# Patient Record
Sex: FEMALE | Race: WHITE
[De-identification: names, ages, dates, MRNs, and addresses within clinical notes are randomized per-mention and may not be internally consistent; named-entity substitution may affect disease eponyms.]

---

## 2017-12-26 NOTE — EDM.PDOC
ED HPI GENERAL MEDICAL PROBLEM





- General


Chief Complaint: General


Stated Complaint: WEAK,PAIN


Time Seen by Provider: 12/26/17 16:25


Source of Information: Reports: Patient


History Limitations: Reports: Other (limited records)





- History of Present Illness


INITIAL COMMENTS - FREE TEXT/NARRATIVE: 





86 yo female presents mainly with neck pain and stiffness. She denies a hx of 

neck arthritis or injury. Has had some chills without fever. Sx's for a couple 

days getting worse. No self tx. Some mild diffuse muscle aches as well. Went 

the clinic and was sent here for ? stroke. 


Onset: Gradual


Onset Date: 12/23/17


Duration: Day(s):, Getting Worse


Location: Reports: Neck, Other (to a lesser extent other muscles.)


Quality: Reports: Ache


Severity: Moderate


Improves with: Reports: Rest


Worsens with: Reports: Movement


Context: Reports: Other (unknown)


Associated Symptoms: Reports: Fever/Chills (no definite fever.)


Treatments PTA: Reports: Other (see below) (none)


  ** NECK


Pain Score (Numeric/FACES): 5





- Related Data


 Allergies











Allergy/AdvReac Type Severity Reaction Status Date / Time


 


Penicillins Allergy Unknown Unknown Verified 12/26/17 16:16


 


Sulfa (Sulfonamide Allergy Unknown Unknown Verified 12/26/17 16:16





Antibiotics)     











Home Meds: 


 Home Meds





Atenolol 50 mg PO BEDTIME 08/19/14 [History]


Simvastatin 10 mg PO BEDTIME 08/19/14 [History]


Metoprolol Succinate [Toprol Xl] 100 mg PO DAILY 12/26/17 [History]











Past Medical History


HEENT History: Reports: Cataract, Impaired Vision


Cardiovascular History: Reports: High Cholesterol, Hypertension


OB/GYN History: Reports: Pregnancy


Musculoskeletal History: Reports: Arthritis





- Infectious Disease History


Infectious Disease History: Reports: C-Difficile





- Past Surgical History


HEENT Surgical History: Reports: Cataract Surgery, Oral Surgery


GI Surgical History: Reports: Colonoscopy





Social & Family History





- Tobacco Use


Smoking Status *Q: Never Smoker





- Caffeine Use


Caffeine Use: Reports: Coffee, Soda





- Recreational Drug Use


Recreational Drug Use: No





ED ROS GENERAL





- Review of Systems


Review Of Systems: See Below


Constitutional: Reports: No Symptoms


HEENT: Reports: No Symptoms


Respiratory: Reports: No Symptoms


Cardiovascular: Reports: No Symptoms


Endocrine: Reports: No Symptoms


GI/Abdominal: Reports: No Symptoms


: Reports: No Symptoms


Musculoskeletal: Reports: Neck Pain, Muscle Stiffness (and soreness)


Skin: Reports: No Symptoms


Neurological: Reports: No Symptoms, Weakness (mild diffuse).  Denies: 

Difficulty Walking


Psychiatric: Reports: No Symptoms





ED EXAM, GENERAL





- Physical Exam


Exam: See Below


Exam Limited By: No Limitations


General Appearance: Alert, WD/WN, No Apparent Distress


Eye Exam: Bilateral Eye: Normal Inspection


Ears: Normal External Exam, Normal Canal, Hearing Grossly Normal, Normal TMs


Ear Exam: Bilateral Ear: Auricle Normal, Canal Normal, TM normal


Nose: Normal Inspection, Normal Mucosa, No Blood


Throat/Mouth: Normal Inspection, Normal Lips, Normal Oropharynx, Normal Voice, 

No Airway Compromise


Head: Atraumatic, Normocephalic


Neck: Limited Range of Motion, Tender Lateral, Other (Neck muscles feel tight).

  No: Lymphadenopathy (L), Thyromegaly


Respiratory/Chest: No Respiratory Distress, Lungs Clear, Normal Breath Sounds, 

No Accessory Muscle Use


Cardiovascular: Regular Rate, Rhythm, No Edema


GI/Abdominal: Normal Bowel Sounds, Soft, Non-Tender, No Distention


Back Exam: Normal Inspection.  No: CVA Tenderness (R), CVA Tenderness (L)


Extremities: Normal Inspection, Normal Range of Motion, Non-Tender, No Pedal 

Edema


Neurological: Alert, Oriented, CN II-XII Intact, Normal Cognition, No Motor/

Sensory Deficits


Psychiatric: Normal Affect, Normal Mood


Skin Exam: Warm, Dry, Intact, Normal Color, No Rash


Lymphatic: No Adenopathy





Course





- Vital Signs


Text/Narrative:: 





Some improvement with tx here in the ER.


Last Recorded V/S: 


 Last Vital Signs











Temp  36.9 C   12/26/17 16:16


 


Pulse  82   12/26/17 16:16


 


Resp  16   12/26/17 16:16


 


BP  168/82 H  12/26/17 16:16


 


Pulse Ox  97   12/26/17 16:16














- Orders/Labs/Meds


Orders: 


 Active Orders 24 hr











 Category Date Time Status


 


 Cervical Spine 2V or 3V [CR] Stat Exams  12/26/17 16:33 Taken


 


 UA W/MICROSCOPIC [URIN] Stat Lab  12/26/17 16:35 Uncollected


 


 Sodium Chloride 0.9% [Saline Flush] Med  12/26/17 16:34 Active





 10 ml FLUSH ASDIRECTED PRN   


 


 Saline Lock Insert [OM.PC] Routine Oth  12/26/17 16:34 Ordered








 Medication Orders





Sodium Chloride (Saline Flush)  10 ml FLUSH ASDIRECTED PRN


   PRN Reason: Keep Vein Open


   Last Admin: 12/26/17 16:49  Dose: 10 ml








Labs: 


 Laboratory Tests











  12/26/17 12/26/17 12/26/17 Range/Units





  16:53 16:53 16:53 


 


WBC  9.9    (4.5-11.0)  K/uL


 


RBC  3.68    (3.30-5.50)  M/uL


 


Hgb  11.5 L    (12.0-15.0)  g/dL


 


Hct  35.4 L    (36.0-48.0)  %


 


MCV  96    (80-98)  fL


 


MCH  31    (27-31)  pg


 


MCHC  33    (32-36)  %


 


Plt Count  237    (150-400)  K/uL


 


Sodium   137 L   (140-148)  mmol/L


 


Potassium   3.8   (3.6-5.2)  mmol/L


 


Chloride   100   (100-108)  mmol/L


 


Carbon Dioxide   26   (21-32)  mmol/L


 


Anion Gap   14.8 H   (5.0-14.0)  mmol/L


 


BUN   17   (7-18)  mg/dL


 


Creatinine   0.6   (0.6-1.0)  mg/dL


 


Est Cr Clr Drug Dosing   61.68   mL/min


 


Estimated GFR (MDRD)   > 60   (>60)  


 


Glucose   121 H   ()  mg/dL


 


Calcium   9.1   (8.5-10.1)  mg/dL


 


Creatine Kinase    118  ()  U/L











Meds: 


Medications











Generic Name Dose Route Start Last Admin





  Trade Name Freq  PRN Reason Stop Dose Admin


 


Sodium Chloride  10 ml  12/26/17 16:34  12/26/17 16:49





  Saline Flush  FLUSH   10 ml





  ASDIRECTED PRN   Administration





  Keep Vein Open   














Discontinued Medications














Generic Name Dose Route Start Last Admin





  Trade Name Freq  PRN Reason Stop Dose Admin


 


Cyclobenzaprine HCl  5 mg  12/26/17 16:35  12/26/17 16:49





  Flexeril  PO  12/26/17 16:36  5 mg





  ONETIME ONE   Administration


 


Ketorolac Tromethamine  15 mg  12/26/17 16:34  12/26/17 16:48





  Toradol  IVPUSH  12/26/17 16:35  15 mg





  ONETIME ONE   Administration














- Radiology Interpretation


Free Text/Narrative:: 





C spine series-degen changes C4-7, karel C6-7.





Departure





- Departure


Time of Disposition: 18:00


Disposition: Home, Self-Care 01


Condition: Fair


Clinical Impression: 


 Cervical arthritis








- Discharge Information


Referrals: 


PCP,None [Primary Care Provider] - 


Forms:  ED Department Discharge





- My Orders


Last 24 Hours: 


My Active Orders





12/26/17 16:33


Cervical Spine 2V or 3V [CR] Stat 





12/26/17 16:34


Sodium Chloride 0.9% [Saline Flush]   10 ml FLUSH ASDIRECTED PRN 


Saline Lock Insert [OM.PC] Routine 





12/26/17 16:35


UA W/MICROSCOPIC [URIN] Stat 














- Assessment/Plan


Last 24 Hours: 


My Active Orders





12/26/17 16:33


Cervical Spine 2V or 3V [CR] Stat 





12/26/17 16:34


Sodium Chloride 0.9% [Saline Flush]   10 ml FLUSH ASDIRECTED PRN 


Saline Lock Insert [OM.PC] Routine 





12/26/17 16:35


UA W/MICROSCOPIC [URIN] Stat

## 2017-12-27 NOTE — CR
Cervical Spine 2V or 3V

 

INDICATION: neck pain, decreased ROM, no injury

 

FINDINGS: Reversal of normal cervical lordosis. Degenerative disc space narrowing and endplate hypert
rophic changes most marked at the C3, C4, C5 interspaces. Slight anterolisthesis of C7 on T1. Mild di
ffuse facet arthropathy.

## 2021-02-21 ENCOUNTER — HOSPITAL ENCOUNTER (OUTPATIENT)
Dept: HOSPITAL 11 - JP.ED | Age: 86
Setting detail: OBSERVATION
LOS: 1 days | Discharge: HOME HEALTH | End: 2021-02-22
Attending: INTERNAL MEDICINE | Admitting: INTERNAL MEDICINE
Payer: MEDICARE

## 2021-02-21 DIAGNOSIS — G30.9: ICD-10-CM

## 2021-02-21 DIAGNOSIS — E86.0: ICD-10-CM

## 2021-02-21 DIAGNOSIS — E78.00: ICD-10-CM

## 2021-02-21 DIAGNOSIS — Z20.822: ICD-10-CM

## 2021-02-21 DIAGNOSIS — E87.6: ICD-10-CM

## 2021-02-21 DIAGNOSIS — Z88.2: ICD-10-CM

## 2021-02-21 DIAGNOSIS — Z88.0: ICD-10-CM

## 2021-02-21 DIAGNOSIS — M11.261: Primary | ICD-10-CM

## 2021-02-21 DIAGNOSIS — F02.80: ICD-10-CM

## 2021-02-21 DIAGNOSIS — Z79.899: ICD-10-CM

## 2021-02-21 DIAGNOSIS — I10: ICD-10-CM

## 2021-02-21 DIAGNOSIS — Z98.890: ICD-10-CM

## 2021-02-21 PROCEDURE — G0378 HOSPITAL OBSERVATION PER HR: HCPCS

## 2021-02-21 PROCEDURE — U0002 COVID-19 LAB TEST NON-CDC: HCPCS

## 2021-02-21 NOTE — EDM.PDOC
ED HPI GENERAL MEDICAL PROBLEM





- General


Chief Complaint: Lower Extremity Injury/Pain


Stated Complaint: DIFFICULTY WALKING/LEG SWOLLEN


Time Seen by Provider: 02/21/21 15:45


Source of Information: Reports: Patient, Old Records, RN


History Limitations: Reports: No Limitations





- History of Present Illness


INITIAL COMMENTS - FREE TEXT/NARRATIVE: 





87 yo female here with a swollen and painful R leg. No SOB. No pleuritic chest 

pain. No hx of injury to that leg. Denies a hx of DVT. Can't walk alone. Has not

been to her doctor. 


Onset: Gradual


Onset Date: 02/16/21


Duration: Day(s): (5 days), Getting Worse


Location: Reports: Lower Extremity, Right


Quality: Reports: Ache


Severity: Moderate


Improves with: Reports: None


Worsens with: Reports: Other (time)


Context: Reports: Other (See HPI)


Associated Symptoms: Reports: No Other Symptoms


Treatments PTA: Reports: Other (see below) (none)





- Related Data


                                    Allergies











Allergy/AdvReac Type Severity Reaction Status Date / Time


 


Penicillins Allergy Unknown Unknown Verified 02/21/21 15:13


 


Sulfa (Sulfonamide Allergy Unknown Unknown Verified 02/21/21 15:13





Antibiotics)     











Home Meds: 


                                    Home Meds





Metoprolol Succinate [Toprol Xl] 100 mg PO DAILY 12/26/17 [History]


Acetaminophen [Tylenol] 650 mg PO Q4H  tablet 10/30/18 [Rx]


Acetaminophen 2 tab PO BID 02/21/21 [History]


Alendronate Sodium 70 mg PO WEEKLY 02/21/21 [History]


Cholestyramine/Aspartame [Cholestyramine Light Powder] 150 gm PO ASDIRECTED 

02/21/21 [History]


Donepezil HCl [Aricept] 10 mg PO BEDTIME 02/21/21 [History]











Past Medical History


HEENT History: Reports: Cataract, Impaired Vision


Cardiovascular History: Reports: High Cholesterol, Hypertension


Gastrointestinal History: Reports: Other (See Below)


Other Gastrointestinal History: mass in the colon will be having surgery 

10/24/2018


Genitourinary History: Reports: None


OB/GYN History: Reports: Pregnancy


Musculoskeletal History: Reports: Arthritis


Psychiatric History: Reports: Other (See Below)


Other Psychiatric History: memory loss


Hematologic History: Reports: Anemia, Iron Deficiency


Oncologic (Cancer) History: Reports: Other (See Below)


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





- Infectious Disease History


Infectious Disease History: Reports: Chicken Pox





- Past Surgical History


Head Surgeries/Procedures: Reports: None


HEENT Surgical History: Reports: Cataract Surgery, Oral Surgery


Cardiovascular Surgical History: Reports: None


GI Surgical History: Reports: Colonoscopy, EGD


Female  Surgical History: Reports: Tubal Ligation


Musculoskeletal Surgical History: Reports: None


Oncologic Surgical History: Reports: None


Dermatological Surgical History: Reports: None





Social & Family History





- Family History


Family Medical History: No Pertinent Family History





- Tobacco Use


Tobacco Use Status *Q: Never Tobacco User


Second Hand Smoke Exposure: No





- Caffeine Use


Caffeine Use: Reports: Coffee, Soda





- Recreational Drug Use


Recreational Drug Use: No





Review of Systems





- Review of Systems


Review Of Systems: See Below


Constitutional: Reports: No Symptoms


Respiratory: Denies: Shortness of Breath, Pleuritic Chest Pain, Hemoptysis


Cardiovascular: Reports: No Symptoms


Musculoskeletal: Reports: Other (entire R leg swollen.)


Skin: Reports: No Symptoms


Neurological: Reports: No Symptoms





ED EXAM, GENERAL





- Physical Exam


Exam: See Below


Exam Limited By: No Limitations


General Appearance: Alert, WD/WN, No Apparent Distress


Ears: Normal External Exam, Hearing Grossly Normal


Ear Exam: Bilateral Ear: Auricle Normal, Canal Normal


Nose: Normal Inspection, No Blood


Throat/Mouth: Normal Inspection, Normal Lips, Normal Oropharynx, Normal Voice, 

No Airway Compromise


Head: Atraumatic, Normocephalic


Neck: Normal Inspection


Respiratory/Chest: No Respiratory Distress, Lungs Clear, Normal Breath Sounds, 

No Accessory Muscle Use


Cardiovascular: Regular Rate, Rhythm, No Edema


Back Exam: Normal Inspection.  No: CVA Tenderness (R), CVA Tenderness (L)


Extremities: Normal Inspection, Normal Range of Motion, Non-Tender, Pedal Edema 

(R leg slightly swollen and warmer than the left leg. Vanda's positive on R. )


Neurological: Alert, Oriented, CN II-XII Intact, Normal Cognition, No 

Motor/Sensory Deficits


Psychiatric: Normal Affect, Normal Mood


Skin Exam: Warm, Dry, Intact, Normal Color, No Rash





Course





- Vital Signs


Text/Narrative:: 





Dr. Lageson called @ 1810h


Last Recorded V/S: 


                                Last Vital Signs











Temp  37.4 C   02/21/21 15:18


 


Pulse  96   02/21/21 17:48


 


Resp  16   02/21/21 15:18


 


BP  157/88 H  02/21/21 17:48


 


Pulse Ox  98   02/21/21 15:52














- Orders/Labs/Meds


Orders: 


                               Active Orders 24 hr











 Category Date Time Status


 


 VL Duplex Lwr Ext Veins Ltd Rt [US] Stat Exams  02/21/21 16:00 Taken


 


 NS + KCl 20mEq/L [Normal Saline with 20 mEq KCl] 1,000 Med  02/21/21 17:30 

Active





 ml   





 IV ASDIRECTED   








                                Medication Orders





Potassium Chloride/Sodium Chloride (Normal Saline With 20 Meq Kcl)  1,000 mls @ 

500 mls/hr IV ASDIRECTED RUTH ANN


   Last Admin: 02/21/21 17:39  Dose: 500 mls/hr


   Documented by: UJAN








Labs: 


                                Laboratory Tests











  02/21/21 02/21/21 02/21/21 Range/Units





  16:28 16:28 16:28 


 


WBC   8.1   (4.5-11.0)  K/uL


 


RBC   3.75   (3.30-5.50)  M/uL


 


Hgb   11.3 L   (12.0-15.0)  g/dL


 


Hct   36.1   (36.0-48.0)  %


 


MCV   96   (80-98)  fL


 


MCH   30   (27-31)  pg


 


MCHC   31 L   (32-36)  %


 


Plt Count   230   (150-400)  K/uL


 


D-Dimer, Quantitative  > 98332.00 H    (0.0-500.0)  ng/mL


 


Sodium    138 L  (140-148)  mmol/L


 


Potassium    3.3 L  (3.6-5.2)  mmol/L


 


Chloride    98 L  (100-108)  mmol/L


 


Carbon Dioxide    26  (21-32)  mmol/L


 


Anion Gap    17.3 H  (5.0-14.0)  mmol/L


 


BUN    22 H D  (7-18)  mg/dL


 


Creatinine    0.8  (0.6-1.0)  mg/dL


 


Est Cr Clr Drug Dosing    45.50  mL/min


 


Estimated GFR (MDRD)    > 60  (>60)  


 


Glucose    118 H  ()  mg/dL


 


Calcium    9.2  (8.5-10.1)  mg/dL


 


Magnesium     (1.8-2.4)  mg/dL


 


Troponin I    < 0.017  (0.000-0.056)  ng/mL


 


C-Reactive Protein     (0.0-0.3)  mg/dL


 


Procalcitonin     ng/mL


 


Urine Color     (YELLOW)  


 


Urine Appearance     (CLEAR)  


 


Urine pH     (5.0-8.0)  


 


Ur Specific Gravity     (1.008-1.030)  


 


Urine Protein     (NEGATIVE)  mg/dL


 


Urine Glucose (UA)     (NEGATIVE)  mg/dL


 


Urine Ketones     (NEGATIVE)  mg/dL


 


Urine Occult Blood     (NEGATIVE)  


 


Urine Nitrite     (NEGATIVE)  


 


Urine Bilirubin     (NEGATIVE)  


 


Urine Urobilinogen     (0.2-1.0)  EU/dL


 


Ur Leukocyte Esterase     (NEGATIVE)  


 


Urine RBC     (0-5)  


 


Urine WBC     (0-5)  


 


Ur Epithelial Cells     


 


Amorphous Sediment     


 


Urine Bacteria     


 


Urine Mucus     


 


SARS CoV-2 RNA Rapid AMAURY     














  02/21/21 02/21/21 02/21/21 Range/Units





  17:28 17:30 17:32 


 


WBC     (4.5-11.0)  K/uL


 


RBC     (3.30-5.50)  M/uL


 


Hgb     (12.0-15.0)  g/dL


 


Hct     (36.0-48.0)  %


 


MCV     (80-98)  fL


 


MCH     (27-31)  pg


 


MCHC     (32-36)  %


 


Plt Count     (150-400)  K/uL


 


D-Dimer, Quantitative     (0.0-500.0)  ng/mL


 


Sodium     (140-148)  mmol/L


 


Potassium     (3.6-5.2)  mmol/L


 


Chloride     (100-108)  mmol/L


 


Carbon Dioxide     (21-32)  mmol/L


 


Anion Gap     (5.0-14.0)  mmol/L


 


BUN     (7-18)  mg/dL


 


Creatinine     (0.6-1.0)  mg/dL


 


Est Cr Clr Drug Dosing     mL/min


 


Estimated GFR (MDRD)     (>60)  


 


Glucose     ()  mg/dL


 


Calcium     (8.5-10.1)  mg/dL


 


Magnesium    2.2  (1.8-2.4)  mg/dL


 


Troponin I     (0.000-0.056)  ng/mL


 


C-Reactive Protein    17.24 H  (0.0-0.3)  mg/dL


 


Procalcitonin     ng/mL


 


Urine Color  Yellow    (YELLOW)  


 


Urine Appearance  Clear    (CLEAR)  


 


Urine pH  5.5    (5.0-8.0)  


 


Ur Specific Gravity  1.025    (1.008-1.030)  


 


Urine Protein  100 H    (NEGATIVE)  mg/dL


 


Urine Glucose (UA)  Negative    (NEGATIVE)  mg/dL


 


Urine Ketones  40 H    (NEGATIVE)  mg/dL


 


Urine Occult Blood  Moderate H    (NEGATIVE)  


 


Urine Nitrite  Negative    (NEGATIVE)  


 


Urine Bilirubin  Small H    (NEGATIVE)  


 


Urine Urobilinogen  1.0    (0.2-1.0)  EU/dL


 


Ur Leukocyte Esterase  Negative    (NEGATIVE)  


 


Urine RBC  0-5    (0-5)  


 


Urine WBC  0-5    (0-5)  


 


Ur Epithelial Cells  Occasional    


 


Amorphous Sediment  Occasional    


 


Urine Bacteria  Occasional    


 


Urine Mucus  Occasional    


 


SARS CoV-2 RNA Rapid AMAURY   Negative   














  02/21/21 Range/Units





  17:32 


 


WBC   (4.5-11.0)  K/uL


 


RBC   (3.30-5.50)  M/uL


 


Hgb   (12.0-15.0)  g/dL


 


Hct   (36.0-48.0)  %


 


MCV   (80-98)  fL


 


MCH   (27-31)  pg


 


MCHC   (32-36)  %


 


Plt Count   (150-400)  K/uL


 


D-Dimer, Quantitative   (0.0-500.0)  ng/mL


 


Sodium   (140-148)  mmol/L


 


Potassium   (3.6-5.2)  mmol/L


 


Chloride   (100-108)  mmol/L


 


Carbon Dioxide   (21-32)  mmol/L


 


Anion Gap   (5.0-14.0)  mmol/L


 


BUN   (7-18)  mg/dL


 


Creatinine   (0.6-1.0)  mg/dL


 


Est Cr Clr Drug Dosing   mL/min


 


Estimated GFR (MDRD)   (>60)  


 


Glucose   ()  mg/dL


 


Calcium   (8.5-10.1)  mg/dL


 


Magnesium   (1.8-2.4)  mg/dL


 


Troponin I   (0.000-0.056)  ng/mL


 


C-Reactive Protein   (0.0-0.3)  mg/dL


 


Procalcitonin  0.23  ng/mL


 


Urine Color   (YELLOW)  


 


Urine Appearance   (CLEAR)  


 


Urine pH   (5.0-8.0)  


 


Ur Specific Gravity   (1.008-1.030)  


 


Urine Protein   (NEGATIVE)  mg/dL


 


Urine Glucose (UA)   (NEGATIVE)  mg/dL


 


Urine Ketones   (NEGATIVE)  mg/dL


 


Urine Occult Blood   (NEGATIVE)  


 


Urine Nitrite   (NEGATIVE)  


 


Urine Bilirubin   (NEGATIVE)  


 


Urine Urobilinogen   (0.2-1.0)  EU/dL


 


Ur Leukocyte Esterase   (NEGATIVE)  


 


Urine RBC   (0-5)  


 


Urine WBC   (0-5)  


 


Ur Epithelial Cells   


 


Amorphous Sediment   


 


Urine Bacteria   


 


Urine Mucus   


 


SARS CoV-2 RNA Rapid AMAURY   











Meds: 


Medications











Generic Name Dose Route Start Last Admin





  Trade Name Freq  PRN Reason Stop Dose Admin


 


Potassium Chloride/Sodium Chloride  1,000 mls @ 500 mls/hr  02/21/21 17:30  

02/21/21 17:39





  Normal Saline With 20 Meq Kcl  IV   500 mls/hr





  ASDIRECTED RUTH ANN   Administration














Discontinued Medications














Generic Name Dose Route Start Last Admin





  Trade Name Freq  PRN Reason Stop Dose Admin


 


Potassium Chloride  20 meq  02/21/21 17:31  02/21/21 17:43





  Klor-Con M20  PO  02/21/21 17:32  20 meq





  ONETIME ONE   Administration














- Re-Assessments/Exams


Free Text/Narrative Re-Assessment/Exam: 





02/21/21 17:02


Venous doppler neg for DVT, now tells me she is weak and not eating due to no 

appetite. 





Departure





- Departure


Time of Disposition: 18:15


Disposition: Refer to Observation


Condition: Fair


Clinical Impression: 


 Mild dehydration, Hypokalemia, Elevated d-dimer, Elevated C-reactive protein 

(CRP), Left leg pain, Inability to walk, Anorexia








- Discharge Information


*PRESCRIPTION DRUG MONITORING PROGRAM REVIEWED*: Not Applicable


*COPY OF PRESCRIPTION DRUG MONITORING REPORT IN PATIENT JUDY: Not Applicable


Referrals: 


Parul Meraz PA-C [Primary Care Provider] - 


Forms:  ED Department Discharge





Sepsis Event Note (ED)





- Evaluation


Sepsis Screening Result: No Definite Risk





- Focused Exam


Vital Signs: 


                                   Vital Signs











  Temp Pulse Resp BP Pulse Ox


 


 02/21/21 17:48   96   157/88 H 


 


 02/21/21 17:18   91   141/71 H 


 


 02/21/21 17:04   96   139/73 


 


 02/21/21 15:52   97   137/72  98


 


 02/21/21 15:18  37.4 C  95  16  140/74  96


 


 02/21/21 15:12  37.4 C  95  16  140/74  96














- My Orders


Last 24 Hours: 


My Active Orders





02/21/21 16:00


VL Duplex Lwr Ext Veins Ltd Rt [US] Stat 





02/21/21 17:30


NS + KCl 20mEq/L [Normal Saline with 20 mEq KCl] 1,000 ml IV ASDIRECTED 














- Assessment/Plan


Last 24 Hours: 


My Active Orders





02/21/21 16:00


VL Duplex Lwr Ext Veins Ltd Rt [US] Stat 





02/21/21 17:30


NS + KCl 20mEq/L [Normal Saline with 20 mEq KCl] 1,000 ml IV ASDIRECTED

## 2021-02-21 NOTE — PCM.HP.2
H&P History of Present Illness





- General


Date of Service: 02/21/21


Admit Problem/Dx: 


                           Admission Diagnosis/Problem





Admission Diagnosis/Problem      Pain








Source of Information: Patient, Family, Provider, RN Notes Reviewed


History Limitations: Reports: Altered Mental Status (Dementia)





- History of Present Illness


Initial Comments - Free Text/Narative: 





Ms. Drummond is an 88-year-old woman who was admitted to observation status 

through the emergency department with right foot and knee pain secondary to 

gout.  She does experience some difficulty with history because of underlying 

dementia.  She thinks that she has had significant pain in her right leg over 

the past 4 days.  Pain progressed to the point where she is unable to bear 

weight or ambulate.  During this period of time she has also had very poor oral 

intake and experienced an episode of nausea vomiting related to use of Tylenol. 

She presented to the emergency department today because of swelling and pain.  

D-dimer was obtained and found to be markedly elevated greater than 10,000.  

Venous Doppler study of the right lower extremity was negative for deep vein 

thrombosis.  CRP was found to be significantly elevated, pro calcitonin 

borderline at 0.23.  White blood cell count is within normal range and she has 

been afebrile while in the emergency department.





- Related Data


Allergies/Adverse Reactions: 


                                    Allergies











Allergy/AdvReac Type Severity Reaction Status Date / Time


 


Penicillins Allergy Unknown Unknown Verified 02/21/21 15:13


 


Sulfa (Sulfonamide Allergy Unknown Unknown Verified 02/21/21 15:13





Antibiotics)     











Home Medications: 


                                    Home Meds





Metoprolol Succinate [Toprol Xl] 100 mg PO DAILY 12/26/17 [History]


Acetaminophen [Tylenol] 650 mg PO Q4H  tablet 10/30/18 [Rx]


Acetaminophen 2 tab PO BID 02/21/21 [History]


Alendronate Sodium 70 mg PO WEEKLY 02/21/21 [History]


Cholestyramine/Aspartame [Cholestyramine Light Powder] 150 gm PO ASDIRECTED 

02/21/21 [History]


Donepezil HCl [Aricept] 10 mg PO BEDTIME 02/21/21 [History]











Past Medical History


HEENT History: Reports: Cataract, Impaired Vision


Cardiovascular History: Reports: High Cholesterol, Hypertension


Gastrointestinal History: Reports: Other (See Below)


Other Gastrointestinal History: mass in the colon will be having surgery 

10/24/2018


Genitourinary History: Reports: None


OB/GYN History: Reports: Pregnancy


Musculoskeletal History: Reports: Arthritis


Psychiatric History: Reports: Other (See Below)


Other Psychiatric History: memory loss


Hematologic History: Reports: Anemia, Iron Deficiency


Oncologic (Cancer) History: Reports: Other (See Below)


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





- Infectious Disease History


Infectious Disease History: Reports: Chicken Pox





- Past Surgical History


Head Surgeries/Procedures: Reports: None


HEENT Surgical History: Reports: Cataract Surgery, Oral Surgery


Cardiovascular Surgical History: Reports: None


GI Surgical History: Reports: Colonoscopy, EGD


Female  Surgical History: Reports: Tubal Ligation


Musculoskeletal Surgical History: Reports: None


Oncologic Surgical History: Reports: None


Dermatological Surgical History: Reports: None





Social & Family History





- Family History


Family Medical History: No Pertinent Family History





- Tobacco Use


Tobacco Use Status *Q: Never Tobacco User


Second Hand Smoke Exposure: No





- Caffeine Use


Caffeine Use: Reports: Coffee, Soda





- Recreational Drug Use


Recreational Drug Use: No





H&P Review of Systems





- Review of Systems:


Review Of Systems: See Below


General: Reports: Malaise, Weakness.  Denies: Fever, Chills


HEENT: Reports: No Symptoms


Pulmonary: Reports: No Symptoms


Cardiovascular: Reports: No Symptoms


Gastrointestinal: Reports: No Symptoms


Genitourinary: Reports: No Symptoms


Musculoskeletal: Reports: Other (Right knee and first MTP joint pain)


Skin: Reports: No Symptoms


Psychiatric: Reports: No Symptoms


Neurological: Reports: No Symptoms


Hematologic/Lymphatic: Reports: No Symptoms


Immunologic: Reports: No Symptoms





Exam





- Exam


Exam: See Below





- Vital Signs


Vital Signs: 


                                Last Vital Signs











Temp  99.4 F   02/21/21 15:18


 


Pulse  96   02/21/21 17:48


 


Resp  16   02/21/21 15:18


 


BP  157/88 H  02/21/21 17:48


 


Pulse Ox  98   02/21/21 15:52











Weight: 133 lb





- Exam


Quality Assessment: DVT Prophylaxis


General: Alert, Cooperative, Mild Distress.  No: Oriented


HEENT: Conjunctiva Clear, Hearing Intact, Normal Nasal Septum, Posterior Pharynx

 Clear, Pupils Equal.  No: Mucosa Moist & Pink


Neck: Supple, Trachea Midline, +2 Carotid Pulse wo Bruit


Lungs: Clear to Auscultation, Normal Respiratory Effort


Cardiovascular: Regular Rate, Regular Rhythm, Normal S1, Normal S2.  No: 

Systolic Murmur, Diastolic Murmur


GI/Abdominal Exam: Soft, Non-Tender, No Organomegaly, No Distention


Back Exam: Normal Inspection, Full Range of Motion


Extremities: Other (Swelling and pain with movement of the right knee, swelling 

and pain to palpation and with movement of the right first MTP joint)


Skin: Warm, Dry, Intact


Neurological: Cranial Nerves Intact, Strength Equal Bilateral, Normal Speech, 

Normal Tone, Sensation Intact.  No: Focal Deficit


Neuro Extensive - Mental Status: Alert, Normal Mood/Affect, Memory Loss-Remote 

Events, Memory Loss-Recent Events.  No: Oriented x3, Normal Cognition, Memory 

Intact





- Patient Data


Lab Results Last 24 hrs: 


                         Laboratory Results - last 24 hr











  02/21/21 02/21/21 02/21/21 Range/Units





  16:28 16:28 16:28 


 


WBC   8.1   (4.5-11.0)  K/uL


 


RBC   3.75   (3.30-5.50)  M/uL


 


Hgb   11.3 L   (12.0-15.0)  g/dL


 


Hct   36.1   (36.0-48.0)  %


 


MCV   96   (80-98)  fL


 


MCH   30   (27-31)  pg


 


MCHC   31 L   (32-36)  %


 


Plt Count   230   (150-400)  K/uL


 


D-Dimer, Quantitative  > 42238.00 H    (0.0-500.0)  ng/mL


 


Sodium    138 L  (140-148)  mmol/L


 


Potassium    3.3 L  (3.6-5.2)  mmol/L


 


Chloride    98 L  (100-108)  mmol/L


 


Carbon Dioxide    26  (21-32)  mmol/L


 


Anion Gap    17.3 H  (5.0-14.0)  mmol/L


 


BUN    22 H D  (7-18)  mg/dL


 


Creatinine    0.8  (0.6-1.0)  mg/dL


 


Est Cr Clr Drug Dosing    45.50  mL/min


 


Estimated GFR (MDRD)    > 60  (>60)  


 


Glucose    118 H  ()  mg/dL


 


Calcium    9.2  (8.5-10.1)  mg/dL


 


Magnesium     (1.8-2.4)  mg/dL


 


Troponin I    < 0.017  (0.000-0.056)  ng/mL


 


C-Reactive Protein     (0.0-0.3)  mg/dL


 


Procalcitonin     ng/mL


 


Urine Color     (YELLOW)  


 


Urine Appearance     (CLEAR)  


 


Urine pH     (5.0-8.0)  


 


Ur Specific Gravity     (1.008-1.030)  


 


Urine Protein     (NEGATIVE)  mg/dL


 


Urine Glucose (UA)     (NEGATIVE)  mg/dL


 


Urine Ketones     (NEGATIVE)  mg/dL


 


Urine Occult Blood     (NEGATIVE)  


 


Urine Nitrite     (NEGATIVE)  


 


Urine Bilirubin     (NEGATIVE)  


 


Urine Urobilinogen     (0.2-1.0)  EU/dL


 


Ur Leukocyte Esterase     (NEGATIVE)  


 


Urine RBC     (0-5)  


 


Urine WBC     (0-5)  


 


Ur Epithelial Cells     


 


Amorphous Sediment     


 


Urine Bacteria     


 


Urine Mucus     


 


SARS CoV-2 RNA Rapid AMAURY     














  02/21/21 02/21/21 02/21/21 Range/Units





  17:28 17:30 17:32 


 


WBC     (4.5-11.0)  K/uL


 


RBC     (3.30-5.50)  M/uL


 


Hgb     (12.0-15.0)  g/dL


 


Hct     (36.0-48.0)  %


 


MCV     (80-98)  fL


 


MCH     (27-31)  pg


 


MCHC     (32-36)  %


 


Plt Count     (150-400)  K/uL


 


D-Dimer, Quantitative     (0.0-500.0)  ng/mL


 


Sodium     (140-148)  mmol/L


 


Potassium     (3.6-5.2)  mmol/L


 


Chloride     (100-108)  mmol/L


 


Carbon Dioxide     (21-32)  mmol/L


 


Anion Gap     (5.0-14.0)  mmol/L


 


BUN     (7-18)  mg/dL


 


Creatinine     (0.6-1.0)  mg/dL


 


Est Cr Clr Drug Dosing     mL/min


 


Estimated GFR (MDRD)     (>60)  


 


Glucose     ()  mg/dL


 


Calcium     (8.5-10.1)  mg/dL


 


Magnesium    2.2  (1.8-2.4)  mg/dL


 


Troponin I     (0.000-0.056)  ng/mL


 


C-Reactive Protein    17.24 H  (0.0-0.3)  mg/dL


 


Procalcitonin     ng/mL


 


Urine Color  Yellow    (YELLOW)  


 


Urine Appearance  Clear    (CLEAR)  


 


Urine pH  5.5    (5.0-8.0)  


 


Ur Specific Gravity  1.025    (1.008-1.030)  


 


Urine Protein  100 H    (NEGATIVE)  mg/dL


 


Urine Glucose (UA)  Negative    (NEGATIVE)  mg/dL


 


Urine Ketones  40 H    (NEGATIVE)  mg/dL


 


Urine Occult Blood  Moderate H    (NEGATIVE)  


 


Urine Nitrite  Negative    (NEGATIVE)  


 


Urine Bilirubin  Small H    (NEGATIVE)  


 


Urine Urobilinogen  1.0    (0.2-1.0)  EU/dL


 


Ur Leukocyte Esterase  Negative    (NEGATIVE)  


 


Urine RBC  0-5    (0-5)  


 


Urine WBC  0-5    (0-5)  


 


Ur Epithelial Cells  Occasional    


 


Amorphous Sediment  Occasional    


 


Urine Bacteria  Occasional    


 


Urine Mucus  Occasional    


 


SARS CoV-2 RNA Rapid AMAURY   Negative   














  02/21/21 Range/Units





  17:32 


 


WBC   (4.5-11.0)  K/uL


 


RBC   (3.30-5.50)  M/uL


 


Hgb   (12.0-15.0)  g/dL


 


Hct   (36.0-48.0)  %


 


MCV   (80-98)  fL


 


MCH   (27-31)  pg


 


MCHC   (32-36)  %


 


Plt Count   (150-400)  K/uL


 


D-Dimer, Quantitative   (0.0-500.0)  ng/mL


 


Sodium   (140-148)  mmol/L


 


Potassium   (3.6-5.2)  mmol/L


 


Chloride   (100-108)  mmol/L


 


Carbon Dioxide   (21-32)  mmol/L


 


Anion Gap   (5.0-14.0)  mmol/L


 


BUN   (7-18)  mg/dL


 


Creatinine   (0.6-1.0)  mg/dL


 


Est Cr Clr Drug Dosing   mL/min


 


Estimated GFR (MDRD)   (>60)  


 


Glucose   ()  mg/dL


 


Calcium   (8.5-10.1)  mg/dL


 


Magnesium   (1.8-2.4)  mg/dL


 


Troponin I   (0.000-0.056)  ng/mL


 


C-Reactive Protein   (0.0-0.3)  mg/dL


 


Procalcitonin  0.23  ng/mL


 


Urine Color   (YELLOW)  


 


Urine Appearance   (CLEAR)  


 


Urine pH   (5.0-8.0)  


 


Ur Specific Gravity   (1.008-1.030)  


 


Urine Protein   (NEGATIVE)  mg/dL


 


Urine Glucose (UA)   (NEGATIVE)  mg/dL


 


Urine Ketones   (NEGATIVE)  mg/dL


 


Urine Occult Blood   (NEGATIVE)  


 


Urine Nitrite   (NEGATIVE)  


 


Urine Bilirubin   (NEGATIVE)  


 


Urine Urobilinogen   (0.2-1.0)  EU/dL


 


Ur Leukocyte Esterase   (NEGATIVE)  


 


Urine RBC   (0-5)  


 


Urine WBC   (0-5)  


 


Ur Epithelial Cells   


 


Amorphous Sediment   


 


Urine Bacteria   


 


Urine Mucus   


 


SARS CoV-2 RNA Rapid AMAURY   











Result Diagrams: 


                                 02/21/21 16:28





                                 02/21/21 16:28





Sepsis Event Note





- Evaluation


Sepsis Screening Result: No Definite Risk





- Focused Exam


Vital Signs: 


                                   Vital Signs











  Temp Pulse Resp BP Pulse Ox


 


 02/21/21 17:48   96   157/88 H 


 


 02/21/21 17:18   91   141/71 H 


 


 02/21/21 17:04   96   139/73 


 


 02/21/21 15:52   97   137/72  98


 


 02/21/21 15:18  99.4 F  95  16  140/74  96


 


 02/21/21 15:12  99.4 F  95  16  140/74  96














*Q Meaningful Use (ADM)





- VTE Risk Assess *Q


Each Risk Factor Represents 1 Point: None


Total Score 1 Point Risk Factors: 0


Each Risk Factor Represents 2 Points: None


Total Score 2 Point Risk Factors: 0


Each Risk Factor Represents 3 Points: Age 75 Years or Greater


Total Score 3 Point Risk Factors: 3


Each Risk Factor Represents 5 Points: None


Total Score 5 Point Risk Factors: 0


Venous Thromboembolism Risk Factor Score *Q: 3


Problem List Initiated/Reviewed/Updated: Yes


Orders Last 24hrs: 


                               Active Orders 24 hr











 Category Date Time Status


 


 Patient Status Manage Transfer [TRANSFER] Routine ADT  02/21/21 18:26 Ordered


 


 VL Duplex Lwr Ext Veins Ltd Rt [US] Stat Exams  02/21/21 16:00 Taken


 


 NS + KCl 20mEq/L [Normal Saline with 20 mEq KCl] 1,000 Med  02/21/21 17:30 

Active





 ml   





 IV ASDIRECTED   


 


 Resuscitation Status Routine Resus Stat  02/21/21 18:27 Ordered








                                Medication Orders





Potassium Chloride/Sodium Chloride (Normal Saline With 20 Meq Kcl)  1,000 mls @ 

500 mls/hr IV ASDIRECTED RUTH ANN


   Last Admin: 02/21/21 17:39  Dose: 500 mls/hr


   Documented by: JUAN








Assessment/Plan Comment:: 





ASSESSMENT AND PLAN





GOUT-involving the right knee and right first MTP joint.  Abrupt onset of 

symptoms approximately 4 days ago.  Because of pain she is unable to ambulate or

bear weight.  Swelling noted about the right knee and pain with movement.  

Swelling and tenderness to palpation right first MTP joint.


-Uric acid level pending


-Solu-Medrol 60 mg IV now


-Prednisone 30 mg p.o. daily, continue until symptoms have resolved for 3 days





DEHYDRATION-secondary to poor oral intake over the past few days


-IV fluids for hydration





MILD HYPOKALEMIA


-Oral potassium replacement


-Reassess potassium level in a.m.





NAUSEA AND VOMITING-isolated episode after she took Tylenol.  No symptoms 

present today.


-Monitor for recurrence during hospitalization





MAINTENANCE ISSUES


-DVT prophylaxis; Lovenox 40 mg subcu daily


-GI prophylaxis; not indicated


-Viera catheter; not indicated


-Nutrition; 2 g sodium diet


-Nicotine dependence; not required





CODE STATUS-FULL CODE





ADMISSION STATUS-this patient will be admitted to observation status, expect no 

more than a one night hospital stay for evaluation and management of problems as

outlined above.





DISPOSITION-anticipate discharge to home after the hospital stay.





PRIMARY CARE PROVIDER-Parul Meraz





- Mortality Measure


Prognosis:: Good

## 2021-02-22 NOTE — US
VL Duplex Lwr Ext Veins Ltd Rt

 

INDICATION: RT LEG PAIN AND SWELLING

 

FINDINGS: Ultrasound examination of the lower extremity using Doppler and

compressive technique demonstrates that the common femoral, femoral, and

popliteal veins are patent, and negative for thrombus. The calf veins were

segmentally visualized and are negative where seen. 

 

 

IMPRESSION: Negative for deep venous thrombosis.

## 2021-02-22 NOTE — [id number]
**Discharge Summary





- Hospital Course


Brief History: 88-year-old female with history of essential hypertension and 

mild Alzheimer's dementia who presented with right knee and right foot pain and 

swelling.  She was admitted for management of presumed pseudogout with inability

to bear any weight.


Diagnosis: Stroke: No





- Discharge Data


Discharge Date: 02/22/21


Discharge Disposition: Home, W [facility] Health Agency [facility]


Condition: Good





- Referral to Home Health


Date of Face to Face Encounter: 02/22/21


Reason for Homebound Status: Acute knee pain complicating chronic weakness


Primary Care Physician: 


Parul Meraz PA-C





Skilled Need: Nursing, physical therapy and home health aide





- Discharge Diagnosis/Problem(s)


(1) Pseudogout of right knee


SNOMED Code(s): 703299223, 885982226


   ICD Code: M11.261 - OTHER CHONDROCALCINOSIS, RIGHT KNEE   Status: Acute   

Current Visit: Yes   





(2) Mild dehydration


SNOMED Code(s): 4129849400914


   ICD Code: E86.0 - DEHYDRATION   Status: Acute   Current Visit: Yes   





(3) Hypokalemia


SNOMED Code(s): 55176536


   ICD Code: E87.6 - HYPOKALEMIA   Status: Acute   Current Visit: Yes   





- Patient Summary/Data


Consults: 


                                  Consultations





02/21/21 19:10


PT Evaluation and Treatment [CONS] Routine 


   Please Evaluate and Treat.


   PT Reason for Consult: Right leg pain


   This query below is only for informational purposes and is not editable.











Hospital Course: 





Toshia presented to the emergency room with acute onset of right knee and right 

foot pain.  Work-up in the emergency room raised concern for pseudogout.  The 

patient was not able to bear any weight and was admitted for additional 

management and pain control.  Her uric acid level was normal so pseudogout was 

suspected with the acute onset and recent dehydration.  She received some IV 

fluids as well as IV Solu-Medrol at the time of admission and was started on 

prednisone in the morning after admission.  By the day after admission she was 

feeling a fair amount better.  She was able to bear weight and was able to get 

to the bathroom and back with her walker.  She did work with physical therapy.  

There is minimal swelling but no significant warmth or redness remaining.  She 

feels well enough to go home at this time.  I believe she is safe for outpatient

 management.  She was interested in home health care services.  She will get 

prednisone daily for 3 more days.  She should follow-up in approximately 1 week.





- Patient Instructions


Diet: Regular Diet as Tolerated


Activity: As Tolerated


Showering/Bathing: May Shower


Notify Provider of: Fever, Increased Pain


Other/Special Instructions: 1. You were in the hospital for management of pain 

and swelling involving the right knee and right foot.  I suspect that the pain 

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  Your 

condition is improving with anti-inflammatory therapy.  I do recommend ongoing 

treatment including prednisone 20 mg once daily for 3 days.  Your first dose 

outside of the hospital will be to Tuesday morning.  You may also use ice or 

heat to help with any discomfort or swelling.  Acetaminophen (Tylenol) is a safe

 choice for pain control.  2. Continue your usual home medications as previously

 prescribed.  3. I have placed a referral to home health care services.  They 

will provide nursing, physical therapy and home health aide services to ease 

your transition home after the hospital stay.





- Discharge Plan


*PRESCRIPTION DRUG MONITORING PROGRAM REVIEWED*: Not Applicable


*COPY OF PRESCRIPTION DRUG MONITORING REPORT IN PATIENT JUDY: Not Applicable


Prescriptions/Med Rec: 


predniSONE [Prednisone] 20 mg PO DAILY #3 tablet


Home Medications: 


                                    Home Meds





Metoprolol Succinate [Toprol Xl] 100 mg PO DAILY 12/26/17 [History]


Acetaminophen [Tylenol] 650 mg PO Q4H  tablet 10/30/18 [Rx]


Acetaminophen 2 tab PO BID 02/21/21 [History]


Alendronate Sodium 70 mg PO WEEKLY 02/21/21 [History]


Cholestyramine/Aspartame [Cholestyramine Light Powder] 150 gm PO ASDIRECTED 

02/21/21 [History]


Donepezil HCl [Aricept] 10 mg PO BEDTIME 02/21/21 [History]


predniSONE [Prednisone] 20 mg PO DAILY #3 tablet 02/22/21 [Rx]








Patient Handouts:  Calcium Pyrophosphate Deposition, Prednisone tablets


Referrals: 


Parul Meraz PA-C [Primary Care Provider] -  (f/u in 1 week - f/u 

hospital stay for pseudogout of right knee and right foot)





- Discharge Summary/Plan Comment


DC Time >30 min.: No





- Patient Data


Vitals - Most Recent: 


                                Last Vital Signs











Temp  36.4 C   02/22/21 11:00


 


Pulse  80   02/22/21 11:00


 


Resp  16   02/22/21 11:00


 


BP  126/78   02/22/21 11:00


 


Pulse Ox  99   02/22/21 11:00











Weight - Most Recent: 49.668 kg


I&O - Last 24 hours: 


                                 Intake & Output











 02/21/21 02/22/21 02/22/21





 22:59 06:59 14:59


 


Intake Total  679 


 


Output Total  225 


 


Balance  454 











Lab Results - Last 24 hrs: 


                         Laboratory Results - last 24 hr











  02/21/21 02/21/21 02/21/21 Range/Units





  16:28 16:28 16:28 


 


WBC   8.1   (4.5-11.0)  K/uL


 


RBC   3.75   (3.30-5.50)  M/uL


 


Hgb   11.3 L   (12.0-15.0)  g/dL


 


Hct   36.1   (36.0-48.0)  %


 


MCV   96   (80-98)  fL


 


MCH   30   (27-31)  pg


 


MCHC   31 L   (32-36)  %


 


Plt Count   230   (150-400)  K/uL


 


D-Dimer, Quantitative  > 31142.00 H    (0.0-500.0)  ng/mL


 


Sodium    138 L  (140-148)  mmol/L


 


Potassium    3.3 L  (3.6-5.2)  mmol/L


 


Chloride    98 L  (100-108)  mmol/L


 


Carbon Dioxide    26  (21-32)  mmol/L


 


Anion Gap    17.3 H  (5.0-14.0)  mmol/L


 


BUN    22 H D  (7-18)  mg/dL


 


Creatinine    0.8  (0.6-1.0)  mg/dL


 


Est Cr Clr Drug Dosing    45.50  mL/min


 


Estimated GFR (MDRD)    > 60  (>60)  


 


Glucose    118 H  ()  mg/dL


 


Uric Acid     (2.6-6.2)  mg/dL


 


Calcium    9.2  (8.5-10.1)  mg/dL


 


Magnesium     (1.8-2.4)  mg/dL


 


Total Bilirubin     (0.2-1.0)  mg/dL


 


AST     (15-37)  U/L


 


ALT     (12-78)  U/L


 


Alkaline Phosphatase     ()  U/L


 


Creatine Kinase     ()  U/L


 


Troponin I    < 0.017  (0.000-0.056)  ng/mL


 


C-Reactive Protein     (0.0-0.3)  mg/dL


 


Total Protein     (6.4-8.2)  g/dL


 


Albumin     (3.4-5.0)  g/dL


 


Globulin     (2.3-3.5)  g/dL


 


Albumin/Globulin Ratio     (1.2-2.2)  


 


Procalcitonin     ng/mL


 


Urine Color     (YELLOW)  


 


Urine Appearance     (CLEAR)  


 


Urine pH     (5.0-8.0)  


 


Ur Specific Gravity     (1.008-1.030)  


 


Urine Protein     (NEGATIVE)  mg/dL


 


Urine Glucose (UA)     (NEGATIVE)  mg/dL


 


Urine Ketones     (NEGATIVE)  mg/dL


 


Urine Occult Blood     (NEGATIVE)  


 


Urine Nitrite     (NEGATIVE)  


 


Urine Bilirubin     (NEGATIVE)  


 


Urine Urobilinogen     (0.2-1.0)  EU/dL


 


Ur Leukocyte Esterase     (NEGATIVE)  


 


Urine RBC     (0-5)  


 


Urine WBC     (0-5)  


 


Ur Epithelial Cells     


 


Amorphous Sediment     


 


Urine Bacteria     


 


Urine Mucus     


 


SARS CoV-2 RNA Rapid AMAURY     














  02/21/21 02/21/21 02/21/21 Range/Units





  16:28 16:28 17:28 


 


WBC     (4.5-11.0)  K/uL


 


RBC     (3.30-5.50)  M/uL


 


Hgb     (12.0-15.0)  g/dL


 


Hct     (36.0-48.0)  %


 


MCV     (80-98)  fL


 


MCH     (27-31)  pg


 


MCHC     (32-36)  %


 


Plt Count     (150-400)  K/uL


 


D-Dimer, Quantitative     (0.0-500.0)  ng/mL


 


Sodium     (140-148)  mmol/L


 


Potassium     (3.6-5.2)  mmol/L


 


Chloride     (100-108)  mmol/L


 


Carbon Dioxide     (21-32)  mmol/L


 


Anion Gap     (5.0-14.0)  mmol/L


 


BUN     (7-18)  mg/dL


 


Creatinine     (0.6-1.0)  mg/dL


 


Est Cr Clr Drug Dosing     mL/min


 


Estimated GFR (MDRD)     (>60)  


 


Glucose     ()  mg/dL


 


Uric Acid  2.7    (2.6-6.2)  mg/dL


 


Calcium     (8.5-10.1)  mg/dL


 


Magnesium     (1.8-2.4)  mg/dL


 


Total Bilirubin     (0.2-1.0)  mg/dL


 


AST     (15-37)  U/L


 


ALT     (12-78)  U/L


 


Alkaline Phosphatase     ()  U/L


 


Creatine Kinase   95   ()  U/L


 


Troponin I     (0.000-0.056)  ng/mL


 


C-Reactive Protein     (0.0-0.3)  mg/dL


 


Total Protein     (6.4-8.2)  g/dL


 


Albumin     (3.4-5.0)  g/dL


 


Globulin     (2.3-3.5)  g/dL


 


Albumin/Globulin Ratio     (1.2-2.2)  


 


Procalcitonin     ng/mL


 


Urine Color    Yellow  (YELLOW)  


 


Urine Appearance    Clear  (CLEAR)  


 


Urine pH    5.5  (5.0-8.0)  


 


Ur Specific Gravity    1.025  (1.008-1.030)  


 


Urine Protein    100 H  (NEGATIVE)  mg/dL


 


Urine Glucose (UA)    Negative  (NEGATIVE)  mg/dL


 


Urine Ketones    40 H  (NEGATIVE)  mg/dL


 


Urine Occult Blood    Moderate H  (NEGATIVE)  


 


Urine Nitrite    Negative  (NEGATIVE)  


 


Urine Bilirubin    Small H  (NEGATIVE)  


 


Urine Urobilinogen    1.0  (0.2-1.0)  EU/dL


 


Ur Leukocyte Esterase    Negative  (NEGATIVE)  


 


Urine RBC    0-5  (0-5)  


 


Urine WBC    0-5  (0-5)  


 


Ur Epithelial Cells    Occasional  


 


Amorphous Sediment    Occasional  


 


Urine Bacteria    Occasional  


 


Urine Mucus    Occasional  


 


SARS CoV-2 RNA Rapid AMAURY     














  02/21/21 02/21/21 02/21/21 Range/Units





  17:30 17:32 17:32 


 


WBC     (4.5-11.0)  K/uL


 


RBC     (3.30-5.50)  M/uL


 


Hgb     (12.0-15.0)  g/dL


 


Hct     (36.0-48.0)  %


 


MCV     (80-98)  fL


 


MCH     (27-31)  pg


 


MCHC     (32-36)  %


 


Plt Count     (150-400)  K/uL


 


D-Dimer, Quantitative     (0.0-500.0)  ng/mL


 


Sodium     (140-148)  mmol/L


 


Potassium     (3.6-5.2)  mmol/L


 


Chloride     (100-108)  mmol/L


 


Carbon Dioxide     (21-32)  mmol/L


 


Anion Gap     (5.0-14.0)  mmol/L


 


BUN     (7-18)  mg/dL


 


Creatinine     (0.6-1.0)  mg/dL


 


Est Cr Clr Drug Dosing     mL/min


 


Estimated GFR (MDRD)     (>60)  


 


Glucose     ()  mg/dL


 


Uric Acid     (2.6-6.2)  mg/dL


 


Calcium     (8.5-10.1)  mg/dL


 


Magnesium   2.2   (1.8-2.4)  mg/dL


 


Total Bilirubin     (0.2-1.0)  mg/dL


 


AST     (15-37)  U/L


 


ALT     (12-78)  U/L


 


Alkaline Phosphatase     ()  U/L


 


Creatine Kinase     ()  U/L


 


Troponin I     (0.000-0.056)  ng/mL


 


C-Reactive Protein   17.24 H   (0.0-0.3)  mg/dL


 


Total Protein     (6.4-8.2)  g/dL


 


Albumin     (3.4-5.0)  g/dL


 


Globulin     (2.3-3.5)  g/dL


 


Albumin/Globulin Ratio     (1.2-2.2)  


 


Procalcitonin    0.23  ng/mL


 


Urine Color     (YELLOW)  


 


Urine Appearance     (CLEAR)  


 


Urine pH     (5.0-8.0)  


 


Ur Specific Gravity     (1.008-1.030)  


 


Urine Protein     (NEGATIVE)  mg/dL


 


Urine Glucose (UA)     (NEGATIVE)  mg/dL


 


Urine Ketones     (NEGATIVE)  mg/dL


 


Urine Occult Blood     (NEGATIVE)  


 


Urine Nitrite     (NEGATIVE)  


 


Urine Bilirubin     (NEGATIVE)  


 


Urine Urobilinogen     (0.2-1.0)  EU/dL


 


Ur Leukocyte Esterase     (NEGATIVE)  


 


Urine RBC     (0-5)  


 


Urine WBC     (0-5)  


 


Ur Epithelial Cells     


 


Amorphous Sediment     


 


Urine Bacteria     


 


Urine Mucus     


 


SARS CoV-2 RNA Rapid AMAURY  Negative    














  02/22/21 02/22/21 Range/Units





  05:30 05:30 


 


WBC  5.3   (4.5-11.0)  K/uL


 


RBC  3.53   (3.30-5.50)  M/uL


 


Hgb  10.5 L   (12.0-15.0)  g/dL


 


Hct  33.7 L   (36.0-48.0)  %


 


MCV  96   (80-98)  fL


 


MCH  30   (27-31)  pg


 


MCHC  31 L   (32-36)  %


 


Plt Count  220   (150-400)  K/uL


 


D-Dimer, Quantitative    (0.0-500.0)  ng/mL


 


Sodium   139 L  (140-148)  mmol/L


 


Potassium   4.5  (3.6-5.2)  mmol/L


 


Chloride   104  (100-108)  mmol/L


 


Carbon Dioxide   22  (21-32)  mmol/L


 


Anion Gap   17.5 H  (5.0-14.0)  mmol/L


 


BUN   15  (7-18)  mg/dL


 


Creatinine   0.6  (0.6-1.0)  mg/dL


 


Est Cr Clr Drug Dosing   50.82  mL/min


 


Estimated GFR (MDRD)   > 60  (>60)  


 


Glucose   165 H  ()  mg/dL


 


Uric Acid    (2.6-6.2)  mg/dL


 


Calcium   8.7  (8.5-10.1)  mg/dL


 


Magnesium   2.1  (1.8-2.4)  mg/dL


 


Total Bilirubin   0.5  (0.2-1.0)  mg/dL


 


AST   14 L  (15-37)  U/L


 


ALT   10 L  (12-78)  U/L


 


Alkaline Phosphatase   79  ()  U/L


 


Creatine Kinase    ()  U/L


 


Troponin I    (0.000-0.056)  ng/mL


 


C-Reactive Protein    (0.0-0.3)  mg/dL


 


Total Protein   6.5  (6.4-8.2)  g/dL


 


Albumin   2.3 L  (3.4-5.0)  g/dL


 


Globulin   4.2 H  (2.3-3.5)  g/dL


 


Albumin/Globulin Ratio   0.6 L  (1.2-2.2)  


 


Procalcitonin    ng/mL


 


Urine Color    (YELLOW)  


 


Urine Appearance    (CLEAR)  


 


Urine pH    (5.0-8.0)  


 


Ur Specific Gravity    (1.008-1.030)  


 


Urine Protein    (NEGATIVE)  mg/dL


 


Urine Glucose (UA)    (NEGATIVE)  mg/dL


 


Urine Ketones    (NEGATIVE)  mg/dL


 


Urine Occult Blood    (NEGATIVE)  


 


Urine Nitrite    (NEGATIVE)  


 


Urine Bilirubin    (NEGATIVE)  


 


Urine Urobilinogen    (0.2-1.0)  EU/dL


 


Ur Leukocyte Esterase    (NEGATIVE)  


 


Urine RBC    (0-5)  


 


Urine WBC    (0-5)  


 


Ur Epithelial Cells    


 


Amorphous Sediment    


 


Urine Bacteria    


 


Urine Mucus    


 


SARS CoV-2 RNA Rapid AMAURY    











Med Orders - Current: 


                               Current Medications





Acetaminophen (Tylenol)  650 mg PO Q4H PRN


   PRN Reason: Pain (Mild 1-3)/fever


Enoxaparin Sodium (Lovenox)  40 mg SUBCUT BEDTIME [facility]


Sodium Chloride (Normal Saline)  1,000 mls @ 75 mls/hr IV ASDIRECTED [facility]


   Last Admin: 02/22/21 09:00 Dose:  75 mls/hr


   Documented by: 


Melatonin (Melatonin)  9 mg PO BEDTIME [facility]


   Last Admin: 02/21/21 20:17 Dose:  9 mg


   Documented by: 


Ondansetron HCl (Zofran)  4 mg IV Q4H PRN


   PRN Reason: Nausea/Vomiting


Polyethylene Glycol (Miralax)  17 gm PO DAILY PRN


   PRN Reason: Constipation


Prednisone (Prednisone)  30 mg PO DAILY [facility]


   Last Admin: 02/22/21 09:03 Dose:  30 mg


   Documented by: 


Sodium Chloride (Saline Flush)  10 ml FLUSH ASDIRECTED PRN


   PRN Reason: Keep Vein Open





Discontinued Medications





Donepezil HCl (Aricept)  10 mg PO BEDTIME [facility]


Enoxaparin Sodium (Lovenox)  40 mg SUBCUT DAILY [facility]


   Last Admin: 02/21/21 20:17 Dose:  40 mg


   Documented by: 


Potassium Chloride/Sodium Chloride (Normal Saline With 20 Meq Kcl)  1,000 mls @ 

500 mls/hr IV ASDIRECTED [facility]


   Last Admin: 02/21/21 17:39 Dose:  500 mls/hr


   Documented by: 


Methylprednisolone Sodium Succinate (Solu-Medrol)  60 mg IVPUSH ONETIME ONE


   Stop: 02/21/21 19:12


   Last Admin: 02/21/21 20:18 Dose:  60 mg


   Documented by: 


Non-Formulary Medication (Metoprolol Succinate [Toprol Xl])  100 mg PO DAILY [facility]


Potassium Chloride (Klor-Con M20)  20 meq PO ONETIME ONE


   Stop: 02/21/21 17:32


   Last Admin: 02/21/21 17:43 Dose:  20 meq


   Documented by: 


Potassium Chloride (Klor-Con M20)  40 meq PO ONETIME ONE


   Stop: 02/21/21 21:01


   Last Admin: 02/21/21 20:14 Dose:  40 meq


   Documented by: 


Prednisone (Prednisone)  30 mg PO DAILY [facility]